# Patient Record
Sex: FEMALE | Race: WHITE
[De-identification: names, ages, dates, MRNs, and addresses within clinical notes are randomized per-mention and may not be internally consistent; named-entity substitution may affect disease eponyms.]

---

## 2020-11-29 ENCOUNTER — HOSPITAL ENCOUNTER (INPATIENT)
Dept: HOSPITAL 35 - ER | Age: 20
LOS: 2 days | Discharge: HOME | DRG: 343 | End: 2020-12-01
Payer: COMMERCIAL

## 2020-11-29 VITALS — DIASTOLIC BLOOD PRESSURE: 70 MMHG | SYSTOLIC BLOOD PRESSURE: 122 MMHG

## 2020-11-29 VITALS — DIASTOLIC BLOOD PRESSURE: 56 MMHG | SYSTOLIC BLOOD PRESSURE: 112 MMHG

## 2020-11-29 VITALS — SYSTOLIC BLOOD PRESSURE: 109 MMHG | DIASTOLIC BLOOD PRESSURE: 60 MMHG

## 2020-11-29 VITALS
BODY MASS INDEX: 19.7 KG/M2 | WEIGHT: 130 LBS | DIASTOLIC BLOOD PRESSURE: 57 MMHG | HEIGHT: 67.99 IN | SYSTOLIC BLOOD PRESSURE: 141 MMHG

## 2020-11-29 DIAGNOSIS — Z20.828: ICD-10-CM

## 2020-11-29 DIAGNOSIS — Z88.1: ICD-10-CM

## 2020-11-29 DIAGNOSIS — Z79.899: ICD-10-CM

## 2020-11-29 DIAGNOSIS — Z87.891: ICD-10-CM

## 2020-11-29 DIAGNOSIS — K35.80: Primary | ICD-10-CM

## 2020-11-29 LAB
ALBUMIN SERPL-MCNC: 4.1 G/DL (ref 3.4–5)
ALT SERPL-CCNC: 26 U/L (ref 30–65)
ANION GAP SERPL CALC-SCNC: 9 MMOL/L (ref 7–16)
AST SERPL-CCNC: 19 U/L (ref 15–37)
BASOPHILS NFR BLD AUTO: 0.3 % (ref 0–2)
BILIRUB SERPL-MCNC: 1.3 MG/DL (ref 0.2–1)
BILIRUB UR-MCNC: NEGATIVE MG/DL
BUN SERPL-MCNC: 15 MG/DL (ref 7–18)
CALCIUM SERPL-MCNC: 9.3 MG/DL (ref 8.5–10.1)
CHLORIDE SERPL-SCNC: 101 MMOL/L (ref 98–107)
CO2 SERPL-SCNC: 26 MMOL/L (ref 21–32)
COLOR UR: YELLOW
CREAT SERPL-MCNC: 0.9 MG/DL (ref 0.6–1)
EOSINOPHIL NFR BLD: 0.7 % (ref 0–3)
ERYTHROCYTE [DISTWIDTH] IN BLOOD BY AUTOMATED COUNT: 12.7 % (ref 10.5–14.5)
GLUCOSE SERPL-MCNC: 105 MG/DL (ref 74–106)
GRANULOCYTES NFR BLD MANUAL: 85.2 % (ref 36–66)
HCT VFR BLD CALC: 38.3 % (ref 37–47)
HGB BLD-MCNC: 12.7 GM/DL (ref 12–15)
KETONES UR STRIP-MCNC: (no result) MG/DL
LIPASE: 73 U/L (ref 73–393)
LYMPHOCYTES NFR BLD AUTO: 6.9 % (ref 24–44)
MCH RBC QN AUTO: 29.6 PG (ref 26–34)
MCHC RBC AUTO-ENTMCNC: 33.1 G/DL (ref 28–37)
MCV RBC: 89.6 FL (ref 80–100)
MONOCYTES NFR BLD: 6.9 % (ref 1–8)
NEUTROPHILS # BLD: 10.4 THOU/UL (ref 1.4–8.2)
PLATELET # BLD: 181 THOU/UL (ref 150–400)
POTASSIUM SERPL-SCNC: 3.5 MMOL/L (ref 3.5–5.1)
PROT SERPL-MCNC: 7 G/DL (ref 6.4–8.2)
RBC # BLD AUTO: 4.27 MIL/UL (ref 4.2–5)
RBC # UR STRIP: NEGATIVE /UL
RBC #/AREA URNS HPF: (no result) /HPF (ref 0–2)
SODIUM SERPL-SCNC: 136 MMOL/L (ref 136–145)
SP GR UR STRIP: 1.02 (ref 1–1.03)
SQUAMOUS: (no result) /LPF (ref 0–3)
URINE CLARITY: (no result)
URINE GLUCOSE-RANDOM*: NEGATIVE
URINE LEUKOCYTES-REFLEX: (no result)
URINE NITRITE-REFLEX: NEGATIVE
URINE PROTEIN (DIPSTICK): NEGATIVE
UROBILINOGEN UR STRIP-ACNC: 0.2 E.U./DL (ref 0.2–1)
WBC # BLD AUTO: 12.2 THOU/UL (ref 4–11)

## 2020-11-29 PROCEDURE — 50555 KIDNEY ENDOSCOPY & BIOPSY: CPT

## 2020-11-29 PROCEDURE — 50411: CPT

## 2020-11-29 PROCEDURE — 53312: CPT

## 2020-11-29 PROCEDURE — 56525: CPT

## 2020-11-29 PROCEDURE — 50101: CPT

## 2020-11-29 PROCEDURE — 10102: CPT

## 2020-11-29 PROCEDURE — 50010 RENAL EXPLORATION: CPT

## 2020-11-29 PROCEDURE — 52265 CYSTOSCOPY AND TREATMENT: CPT

## 2020-11-29 PROCEDURE — 53307: CPT

## 2020-11-29 PROCEDURE — 50739: CPT

## 2020-11-29 PROCEDURE — 70005: CPT

## 2020-11-29 PROCEDURE — 56526: CPT

## 2020-11-29 PROCEDURE — 52266: CPT

## 2020-11-29 PROCEDURE — 53314: CPT

## 2020-11-29 PROCEDURE — 50740 FUSION OF URETER & KIDNEY: CPT

## 2020-11-29 PROCEDURE — 62110: CPT

## 2020-11-29 PROCEDURE — 62900: CPT

## 2020-11-29 NOTE — O
[facility]
[address]zm[address]
[place]   [address]                     OPERATIVE REPORT              
_______________________________________________________________________________
 
Name:       NADYA SNEED                  Room #:         436-P       ADM IN  
M.R.#:      [id number]                       Account #:      [id number]  
Admission:  11/29/20    Attend Phys:    Memo Bui,
Discharge:              Date of Birth:  [birth year]  
                                                          Report #: [id number]
                                                                    [id number]   
_______________________________________________________________________________
THIS REPORT FOR:  
 
cc:  JUSTIN - [facility] family physician/PCP 
     JUSTIN - No family physician/PCP                                        
     Memo Bui MD                                     ~
CC: [facility] physician/PCP
    Memo Bui
 
DATE OF SERVICE:  11/30/2020
 
 
PREOPERATIVE DIAGNOSIS:  Acute appendicitis.
 
POSTOPERATIVE DIAGNOSIS:  Acute appendicitis.
 
OPERATION:  Laparoscopic appendectomy.
 
SURGEON:  Memo Bui MD
 
ANESTHESIA:  General.
 
ESTIMATED BLOOD LOSS:  Minimal.
 
SPECIMEN:  Appendix.
 
DESCRIPTION OF PROCEDURE:  After informed consent was obtained, the patient was
brought to the operating room and placed supine.  SCDs were placed and working,
preoperative antibiotics were administered, general anesthesia was induced.  The
abdomen was prepped and draped in the usual sterile fashion.  A 10 mm incision
was made below the umbilicus.  Fascia was incised and a trocar was placed. 
Pneumoperitoneum was established.  Right upper quadrant and left lower quadrant
5 mm trocars were placed.
 
The appendix was grasped and retracted anteriorly.  A window was made in the
mesoappendix.  The mesoappendix was ligated with a KALLIE gray load stapler.  I
then ligated the base of the appendix with a KLALIE blue load stapler.  Appendix
was then placed into an Endopouch and removed.  The fascia at the umbilicus was
closed with a figure-of-eight 0 Vicryl.  Skin was closed with 4-0 Monocryl. 
Incisions were sealed with Steri-Strips.
 
COMPLICATIONS:  None.
 
 
 
[facility]
[address]
[place]   [address]                     OPERATIVE REPORT              
_______________________________________________________________________________
 
Name:       NADYA SNEED                  Room #:         436-P       [facility] [place]  
.R.#:      [id number]                       Account #:      [id number]  
Admission:  11/29/20    Attend Phys:    Memo Bui,
Discharge:              Date of Birth:  [birth year]  
                                                          Report #: [id number]
                                                                    [id number]   
_______________________________________________________________________________
 
DISPOSITION:  The patient was taken to recovery in satisfactory condition.
 
 
 
 
 
 
 
 
 
 
 
 
 
 
 
 
 
 
 
 
 
 
 
 
 
 
 
 
 
 
 
 
 
 
 
 
 
 
 
 
 
 
                         
   By:                               
                   
D: 11/30/20 1204                           _____________________________________
T: 11/30/20 1209                           Memo Bui MD       /nt

## 2020-11-29 NOTE — NUR
PATIENT ARRIVED VIA WHEELCHAIR FROM ED. ALERT AND ORIENTED X4. DENIES PAIN.
NEW ORDER FOR ALPRAZOLAM. IVF INFUSING W/O COMPLICATION. COOPERATIVE AND
PLEASANT.

## 2020-11-30 VITALS — DIASTOLIC BLOOD PRESSURE: 72 MMHG | SYSTOLIC BLOOD PRESSURE: 115 MMHG

## 2020-11-30 VITALS — SYSTOLIC BLOOD PRESSURE: 111 MMHG | DIASTOLIC BLOOD PRESSURE: 65 MMHG

## 2020-11-30 VITALS — SYSTOLIC BLOOD PRESSURE: 111 MMHG | DIASTOLIC BLOOD PRESSURE: 60 MMHG

## 2020-11-30 VITALS — DIASTOLIC BLOOD PRESSURE: 65 MMHG | SYSTOLIC BLOOD PRESSURE: 11 MMHG

## 2020-11-30 VITALS — SYSTOLIC BLOOD PRESSURE: 116 MMHG | DIASTOLIC BLOOD PRESSURE: 54 MMHG

## 2020-11-30 VITALS — SYSTOLIC BLOOD PRESSURE: 108 MMHG | DIASTOLIC BLOOD PRESSURE: 65 MMHG

## 2020-11-30 VITALS — SYSTOLIC BLOOD PRESSURE: 113 MMHG | DIASTOLIC BLOOD PRESSURE: 61 MMHG

## 2020-11-30 PROCEDURE — 0DTJ4ZZ RESECTION OF APPENDIX, PERCUTANEOUS ENDOSCOPIC APPROACH: ICD-10-PCS

## 2020-11-30 NOTE — NUR
ASSESSMENT: CM REVIEWED CHART AND SPOKE WITH PT. PT IS ALERT AND ORIENTED X4.
PT IS S/P LAP APPE. PT REPORTS LIVING IN A HOUSE WITH HER AUNT WHO IS AN RN.
PT REPORTS SHE IS FULLY INDEPENDENT WITH ADLS AND AMBULATION. PT REPORTS
HAVING NO INSURANCE. CM PROVIDED PATIENT WITH A [employer] PACKET/SAFETY
NET PACKET. PT DOES NOT ANTICIPATE HAVING ANY NEEDS FROM CM PRIOR TO
DISCHARGE.

## 2020-12-01 VITALS — SYSTOLIC BLOOD PRESSURE: 105 MMHG | DIASTOLIC BLOOD PRESSURE: 54 MMHG

## 2020-12-01 VITALS — SYSTOLIC BLOOD PRESSURE: 103 MMHG | DIASTOLIC BLOOD PRESSURE: 62 MMHG

## 2020-12-01 VITALS — DIASTOLIC BLOOD PRESSURE: 62 MMHG | SYSTOLIC BLOOD PRESSURE: 103 MMHG

## 2020-12-01 NOTE — NUR
PT RESTING IN BED ALERT XS 4 TOOK AM MEDS AND PRN PAIN MED. ABD INCISIONS NO
DRAINAGE. HAS IV ABT. MOM AT BEDSIDE.

## 2020-12-01 NOTE — NUR
ON-GOING ASSESSMENT: PT HAD ORDERS TO D/C HOME TODAY WITH NO NEEDS. PT HAD
[employer] PACKET/SAFETY NET PACKET.

## 2020-12-01 NOTE — NUR
ASSUMED PT CARE AT SHIFT CHANGE. PT IS A&OX4. IV IS IN HER RIGHT AC. PT
TOLLERATES MEDICATION. NO COMPLAINTS OF N/V. PT CALLS OUT APPROPRIATELY FOR
ASSISTANCE. PT COMPLAINS OF PAIN IN HER ABDOMEN AND HER SHOULDER. ADMINISTERED
PAIN MEDICATION. PT IS ON HER PHONE AND IN A HAPPY APPROPRIATE MOOD. PT
AMBULATES TO THE  UP AD NATALIIA. PT IS ASLEEP IN HER ROOM. HOURLY ROUNDING
PERFORMED. WILL CONTINUE TO MONITOR.

## 2023-01-26 NOTE — NUR
ASSUMED PT CARE THIS AM. PT VSS, A&OX4. PT AMBULATORY TO THE RESTROOM. IV
PATENT, FLUIDS INFUSING WITHOUT COMPLAINT. PT COMPLAINING OF NO PAIN. NPO FOR
PROCEDURE TODAY. PT CONTINENT OF B&B. PT CALLS APPROPRIATELY WHEN NEEDED. PT
WENT DOWN FOR SURGERY THIS AM. Rituxan Pregnancy And Lactation Text: This medication is Pregnancy Category C and it isn't know if it is safe during pregnancy. It is unknown if this medication is excreted in breast milk but similar antibodies are known to be excreted.